# Patient Record
Sex: FEMALE | Race: BLACK OR AFRICAN AMERICAN
[De-identification: names, ages, dates, MRNs, and addresses within clinical notes are randomized per-mention and may not be internally consistent; named-entity substitution may affect disease eponyms.]

---

## 2020-03-18 ENCOUNTER — HOSPITAL ENCOUNTER (OUTPATIENT)
Dept: HOSPITAL 41 - JD.SDS | Age: 40
Discharge: HOME | End: 2020-03-18
Attending: OBSTETRICS & GYNECOLOGY
Payer: COMMERCIAL

## 2020-03-18 VITALS — DIASTOLIC BLOOD PRESSURE: 73 MMHG | SYSTOLIC BLOOD PRESSURE: 122 MMHG | HEART RATE: 77 BPM

## 2020-03-18 DIAGNOSIS — N85.8: ICD-10-CM

## 2020-03-18 DIAGNOSIS — N83.8: ICD-10-CM

## 2020-03-18 DIAGNOSIS — N72: ICD-10-CM

## 2020-03-18 DIAGNOSIS — D25.1: Primary | ICD-10-CM

## 2020-03-18 DIAGNOSIS — Z79.899: ICD-10-CM

## 2020-03-18 DIAGNOSIS — Z91.018: ICD-10-CM

## 2020-03-18 PROCEDURE — 36415 COLL VENOUS BLD VENIPUNCTURE: CPT

## 2020-03-18 PROCEDURE — 86901 BLOOD TYPING SEROLOGIC RH(D): CPT

## 2020-03-18 PROCEDURE — 86900 BLOOD TYPING SEROLOGIC ABO: CPT

## 2020-03-18 PROCEDURE — 85025 COMPLETE CBC W/AUTO DIFF WBC: CPT

## 2020-03-18 PROCEDURE — 86850 RBC ANTIBODY SCREEN: CPT

## 2020-03-18 PROCEDURE — 58552 LAPARO-VAG HYST INCL T/O: CPT

## 2020-03-18 PROCEDURE — 80048 BASIC METABOLIC PNL TOTAL CA: CPT

## 2020-03-18 NOTE — PCM.PREANE
Preanesthetic Assessment





- Procedure


Proposed Procedure: 





[facility]





- Anesthesia/Transfusion/Family Hx


Anesthesia History: Prior Anesthesia Without Reaction


Family History of Anesthesia Reaction: No





- Review of Systems


General: No Symptoms


Pulmonary: No Symptoms


Cardiovascular: No Symptoms


Gastrointestinal: No Symptoms


Neurological: No Symptoms


Other: Reports: None





- Physical Assessment


NPO Status Date: 20


NPO Status Time: 20:00


Vital Signs: 





 Last Vital Signs











Temp  36.7 C   20 06:55


 


Pulse  68   20 06:55


 


Resp  16   20 06:55


 


BP  158/89 H  20 06:55


 


Pulse Ox  98   20 06:55











Height: 1.65 m


Weight: 79.832 kg


ASA Class: 1


Airway Class: Mallampati = 2


Dentition: Reports: Normal Dentition


Thyro-Mental Finger Breadths: 3


Mouth Opening Finger Breadths: 3


ROM/Head Extension: Full


Lungs: Clear to Auscultation, Normal Respiratory Effort


Cardiovascular: Regular Rate, Regular Rhythm





- Lab


Values: 





 Laboratory Last Values











WBC  5.97 K/mm3 (3.98-10.04)   20  07:09    


 


RBC  5.49 M/mm3 (3.98-5.22)  H  20  07:09    


 


Hgb  11.1 gm/dl (11.2-15.7)  L  20  07:09    


 


Hct  36.6 % (34.1-44.9)   20  07:09    


 


MCV  66.7 fl (79.4-94.8)  L  20  07:09    


 


MCH  20.2 pg (25.6-32.2)  L  20  07:09    


 


MCHC  30.3 g/dl (32.2-35.5)  L  20  07:09    


 


RDW Std Deviation  47.7 fL (36.4-46.3)  H  20  07:09    


 


Plt Count  333 K/mm3 (182-369)   20  07:09    


 


MPV  TNP   20  07:09    


 


Neut % (Auto)  41.9 % (34.0-71.1)   20  07:09    


 


Lymph % (Auto)  48.7 % (19.3-51.7)   20  07:09    


 


Mono % (Auto)  7.2 % (4.7-12.5)   20  07:09    


 


Eos % (Auto)  2.0  (0.7-5.8)   20  07:09    


 


Baso % (Auto)  0.2 % (0.1-1.2)   20  07:09    


 


Neut # (Auto)  2.50 K/mm3 (1.56-6.13)   20  07:09    


 


Lymph # (Auto)  2.91 K/mm3 (1.18-3.74)   20  07:09    


 


Mono # (Auto)  0.43 K/mm3 (0.24-0.36)  H  20  07:09    


 


Eos # (Auto)  0.12 K/mm3 (0.04-0.36)   20  07:09    


 


Baso # (Auto)  0.01 K/mm3 (0.01-0.08)   20  07:09    


 


Manual Slide Review  Abnormal smear   20  07:09    


 


Sodium  141 mEq/L (136-145)   20  07:09    


 


Potassium  4.0 mEq/L (3.5-5.1)   20  07:09    


 


Chloride  107 mEq/L ()   20  07:09    


 


Carbon Dioxide  26 mEq/L (21-32)   20  07:09    


 


Anion Gap  12.0  (5-15)   20  07:09    


 


BUN  7 mg/dL (7-18)   20  07:09    


 


Creatinine  0.6 mg/dL (0.55-1.02)   20  07:09    


 


Est Cr Clr Drug Dosing  TNP   20  07:09    


 


Estimated GFR (MDRD)  > 60 mL/min (>60)   20  07:09    


 


BUN/Creatinine Ratio  11.7  (14-18)  L  20  07:09    


 


Glucose  108 mg/dL ()  H  20  07:09    


 


Calcium  8.9 mg/dL (8.5-10.1)   20  07:09    














- Allergies


Allergies/Adverse Reactions: 


 Allergies











Allergy/AdvReac Type Severity Reaction Status Date / Time


 


Pork/Porcine Containing Allergy  Cannot Verified 20 13:02





Products   Remember  














- Acknowledgements


Anesthesia Type Planned: General Anesthesia


Pt an Appropriate Candidate for the Planned Anesthesia: Yes


Alternatives and Risks of Anesthesia Discussed w Pt/Guardian: Yes


Pt/Guardian Understands and Agrees with Anesthesia Plan: Yes





PreAnesthesia Questionnaire


HEENT History: Reports: Impaired Vision


Cardiovascular History: Reports: None


Respiratory History: Reports: None


Gastrointestinal History: Reports: None


OB/GYN History: Reports: Pregnancy, Other (See Below)


Other OB/BYN History: fibroid uterus, tubal, leiomyoma


Musculoskeletal History: Reports: None


Neurological History: Reports: None


Psychiatric History: Reports: None


Endocrine/Metabolic History: Reports: Diabetes, Gestational


Hematologic History: Reports: None


Immunologic History: Reports: None


Oncologic (Cancer) History: Reports: None


Dermatologic History: Reports: None





- Past Surgical History


Head Surgeries/Procedures: Reports: None


HEENT Surgical History: Reports: None


Cardiovascular Surgical History: Reports: None


Respiratory Surgical History: Reports: None


GI Surgical History: Reports: None


Female  Surgical History: Reports:  Section


Male  Surgical History: Reports: None


Endocrine Surgical History: Reports: None


Neurological Surgical History: Reports: None


Musculoskeletal Surgical History: Reports: None


Oncologic Surgical History: Reports: None


Dermatological Surgical History: Reports: None





- SUBSTANCE USE


Smoking Status *Q: Never Smoker


Recreational Drug Use History: No





- HOME MEDS


Home Medications: 


 Home Meds





Ferrous Sulfate [Iron] 325 mg PO DAILY 20 [History]


Pnv No.95/Ferrous Fum/Folic AC [Prenatal Caplet] 1 tab PO DAILY 20 [

History]











- CURRENT (IN HOUSE) MEDS


Current Meds: 





 Current Medications





Lactated Ringer's (Ringers, Lactated)  1,000 mls @ 125 mls/hr IV ASDIRECTED JAYLON


Lidocaine/Sodium Bicarbonate (Buffered Lidocaine 1% In Ns 8.4%)  0.25 ml IDERM 

ONETIME PRN


   PRN Reason: Prior to IV Start


Sodium Chloride (Saline Flush)  10 ml FLUSH ASDIRECTED PRN


   PRN Reason: Keep Vein Open





Discontinued Medications





Cefazolin Sodium (Ancef) Confirm Administered Dose 2 gm .ROUTE .STK-MED ONE


   Stop: 20 06:15


Lidocaine/Epinephrine (Xylocaine 1% With Epinephrine 1:100,000) Confirm 

Administered Dose 20 ml .ROUTE .STK-MED ONE


   Stop: 20 07:12

## 2020-03-18 NOTE — PCM.OPNOTE
- General Post-Op/Procedure Note


Date of Surgery/Procedure: 03/18/20


Operative Procedure(s): Excision of residual right fallopian tube.  

Laparoscopic assisted vaginal hysterectomy


Findings: 


Large amount of right fallopian tube present which is dilated slightly.  

Minimal residual left fallopian tube.  Large fibroid projecting off the fundus 

of the uterus, about 6 cm in size.  Moderate amount of scar tissue present 

between the uterus and bladder. Normal appearance of the ovaries. 


Pre Op Diagnosis: Abnormal uterine bleeding.  Fibroid uterus


Post-Op Diagnosis: Same


Anesthesia Technique: General ET Tube


Primary Surgeon: Alesha Arshad


Secondary Surgeon: Deana Savage


Anesthesia Provider: La Phoenix


Reason Assistant Was Necessary: 


Speed, safety of procedure 


Pathology: 


Right fallopian tube, uterus, cervix sent to pathology 


Fluid Replacement, Intraop: 1,900


Output, Urine Amount: 750


EBL in mLs: 300


Complications: None


Condition: Good


Free Text/Narrative:: 





The risks, benefits, indications, potential complications, and alternatives 

were explained to the patient and informed consent obtained.





The patient was taken to the Operating Room where general anesthesia was 

induced without complication. The patient was placed in dorsal lithotomy with 

Deni Stirrups and an exam under anesthesia revealed the findings detailed 

above. The patient was then prepped and draped in the usual sterile fashion. A 

sterile bivalve speculum was placed into the vagina and the anterior lip of the 

cervix was grasped with a single tooth tenaculum and a [employer] uterine 

manipulator was placed to allow uterine manipulation throughout the procedure. 

The speculum and single tooth tenaculum were removed from the vagina. A Reyes 

catheter was placed in sterile fashion.





Attention was then turned to the patients abdomen where a Veress needle was 

carefully introduced into the peritoneal cavity while tenting the abdominal 

wall. Intraperitoneal placement was confirmed by free flow of saline into the 

abdomen from a syringe open to gravity and with a low intraabdominal pressure 

with insufflation of C02 gas on low flow. The gas was increased to high flow 

and a pneumoperitoneum was obtained with C02 gas to a pressure of 15 mm Hg.  A 

5 mm skin incision was made in a vertical fashion in the umbilical fold and a 5 

mm blunt trocar was inserted into the abdomen with direct visualization of the 

laparoscope through the clear view trocar lens.  5 mm skin incisions were made 

in both the left and right lower quadrants approximately 10 cm lateral and 3 cm 

inferior to the umbilicus.  5 mm blunt trocars were inserted into the abdomen 

under direct visualization with care to avoid the abdominal wall vasculature. A 

blunt probe and grasper were inserted through the accessory ports and a survey 

of the abdomen revealed the findings detailed above.





The right fallopian tube was elevated with the blunt graspers at the fimbriated 

end.  The LigaSure was used to grasp, elevate, cauterize and transect the 

mesosalpinx from the fimbriated end toward the uterus to the level of the round 

ligament. The fallopian tube was then amputated at the cornua and placed in the 

posterior cul de sac.  The round ligament on the right was then elevated, 

cauterized, and transected with the Ligasure. Hemostasis was noted. Next, the 

vesicouterine peritoneum was elevated gently with a blunt grasper and the 

LigaSure was used dissect the vesicouterine peritoneum to make a bladder flap. 

Two more small bites along the right side of the uterus were made with the 

Ligasure to skeletonize the uterine artery. Hemostasis was noted.  Next, the 

left round ligament was elevated, cauterized, and transected and a couple of 

additional small bites on the left side of the uterus were made. Hemostasis as 

noted. The CO2 gas was turned off and the laparoscope was removed.  





Attention was then turned to the vaginal portion of the procedure. A short 

weighted speculum was placed in the vagina, and the cervix was grasped with a 

single tooth tenaculum. The cervix was injected circumferentially with 

approximately 15 cc of 1% lidocaine with dilute epinephrine. The cervix was 

then circumferentially incised with a scalpel. A Raytec was used to bluntly 

dissect the cervix circumferentially until an avascular plane was obtained. The 

posterior cul-de-sac was entered sharply without difficulty. A 0-Vicryl pop-off 

suture was placed at six o'clock to include the posterior vaginal mucosa and 

posterior peritoneum. This stitch was tagged with a straight clamp to help with 

vaginal cuff closure at the end of the case. The short weighted speculum was 

replaced by the long weighted speculum. The uterosacral ligaments were grasped 

on either side with the LigaSure, cauterized, and transected.  Hemostasis was 

assured. The bladder was dissected off the pubovesical cervical fascia 

anteriorly with a sponge and blunt dissection. The anterior cul-de-sac was then 

entered sharply without difficulty.  The cardinal ligaments were then serially 

clamped with the LigaSure, cauterized, and transected. The uterine arteries 

were then clamped with the LigaSure, cauterized, and transected.  Hemostasis 

was noted.  The fundus and adnexa were confirmed to be free of any further 

peritoneal attachments and then were pulled out through the vagina.  The 

posterior peritoneum was then closed with a running, locked 0 Vicryl suture.  

The vaginal cuff was closed with a 0-Vicryl in a running locked fashion. 

Hemostasis was noted. 





Attention was then again turned to the abdomen.  All members of the surgical 

team changed gloves.  The laparoscope was again inserted and the abdomen was 

again insufflated with CO2. The pedicles were again visualized.  Christiano seal was 

placed along the vaginal cuff.  Hemostasis was confirmed. The patient was taken 

out of Trendelenberg position.  The accessory trocars were removed under direct 

visualization. The pneumoperitoneum was allowed to escape. The umbilical trocar 

was removed and lastly the camera was removed from the abdomen under direct 

visualization to confirm no herniation into the port site. All skin incisions 

were re-approximated with 4-0 Monocryl and sealed with Dermabond. Hemostasis 

was noted. A total of 10 cc of 0.25% Marcaine was injected into the 

subcutaneous tissues surrounding the skin incisions for local anesthesia. 





All sponge, lap, needle, and instrument counts were correct x 2. The patient 

tolerated the procedure well and there were no complications.

## 2020-03-18 NOTE — PCM.POSTAN
POST ANESTHESIA ASSESSMENT





- MENTAL STATUS


Mental Status: Alert, Oriented





- VITAL SIGNS


Vital Signs: 


 Last Vital Signs











Temp  36.7 C   03/18/20 06:55


 


Pulse  68   03/18/20 06:55


 


Resp  16   03/18/20 06:55


 


BP  154/91 H  03/18/20 07:40


 


Pulse Ox  98   03/18/20 06:55








1126


132/78


60


16


95%


97.4F








- RESPIRATORY


Respiratory Status: Respiratory Rate WNL, Airway Patent, O2 Saturation Stable, 

Supplemental Oxygen





- CARDIOVASCULAR


CV Status: Pulse Rate WNL, Blood Pressure Stable





- GASTROINTESTINAL


GI Status: No Symptoms





- PAIN


Pain Score: 0





- POST OP HYDRATION


Hydration Status: Adequate & Stable

## 2020-03-19 NOTE — [id number]
Post Anesthesia Note





- EVALUATION WITHIN 48HRS OF ANESTHETIC


Vital Signs in Normal Range: Yes


Patient Participated in Evaluation: Yes


Respiratory Function Stable: Yes


Airway Patent: Yes


Cardiovascular Function Stable: Yes


Hydration Status Stable: Yes


Pain Control Satisfactory: Yes


Nausea and Vomiting Control Satisfactory: Yes


Mental Status Recovered: Yes


Vital Signs: 


 Last Vital Signs











Temp  36.4 C   03/18/20 13:45


 


Pulse  77   03/18/20 13:45


 


Resp  16   03/18/20 13:45


 


BP  122/73   03/18/20 13:45


 


Pulse Ox  100   03/18/20 13:45